# Patient Record
Sex: MALE | Race: WHITE
[De-identification: names, ages, dates, MRNs, and addresses within clinical notes are randomized per-mention and may not be internally consistent; named-entity substitution may affect disease eponyms.]

---

## 2021-12-08 ENCOUNTER — HOSPITAL ENCOUNTER (EMERGENCY)
Dept: HOSPITAL 26 - MED | Age: 26
Discharge: HOME | End: 2021-12-08
Payer: COMMERCIAL

## 2021-12-08 VITALS — BODY MASS INDEX: 29.1 KG/M2 | HEIGHT: 68 IN | WEIGHT: 192 LBS

## 2021-12-08 VITALS — DIASTOLIC BLOOD PRESSURE: 87 MMHG | SYSTOLIC BLOOD PRESSURE: 160 MMHG

## 2021-12-08 VITALS — SYSTOLIC BLOOD PRESSURE: 160 MMHG | DIASTOLIC BLOOD PRESSURE: 87 MMHG

## 2021-12-08 DIAGNOSIS — Z79.82: ICD-10-CM

## 2021-12-08 DIAGNOSIS — Z79.899: ICD-10-CM
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
## 2021-12-08 DIAGNOSIS — I48.91: ICD-10-CM

## 2021-12-08 DIAGNOSIS — S43.422A: Primary | ICD-10-CM
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
## 2021-12-08 NOTE — NUR
25 Y/O MALE C/O LEFT ARM PAIN 7/10 DESCRIBES AS ACHING RADIATES TO HAND WITH 
SPASMS, PARETHESIAS. PT STATES HE TOOK TYNENOL PRIOR TO ARRIVAL WITH SOME 
RELIEF. DENIES TRAUMA/INJURY. DENIES N/V, DENIES FEVER/CHILLS.



PMH: AFIB, LINDA



NKA

## 2021-12-08 NOTE — NUR
Patient discharged with v/s stable. Written and verbal after care instructions 
ABOUT SHOULDER SPRAIN given and explained. 

Patient alert, oriented and verbalized understanding of instructions. 
Ambulatory with steady gait. All questions addressed prior to discharge. ID 
band removed. Patient advised to follow up with PMD. Rx of FLEXERIL AND 
NAPROSYN  given.

## 2021-12-11 ENCOUNTER — HOSPITAL ENCOUNTER (INPATIENT)
Dept: HOSPITAL 26 - MED | Age: 26
LOS: 1 days | Discharge: HOME | DRG: 201 | End: 2021-12-12
Attending: INTERNAL MEDICINE | Admitting: INTERNAL MEDICINE
Payer: COMMERCIAL

## 2021-12-11 VITALS — DIASTOLIC BLOOD PRESSURE: 72 MMHG | SYSTOLIC BLOOD PRESSURE: 117 MMHG

## 2021-12-11 VITALS — DIASTOLIC BLOOD PRESSURE: 67 MMHG | SYSTOLIC BLOOD PRESSURE: 115 MMHG

## 2021-12-11 VITALS — DIASTOLIC BLOOD PRESSURE: 70 MMHG | SYSTOLIC BLOOD PRESSURE: 125 MMHG

## 2021-12-11 VITALS — WEIGHT: 192 LBS | HEIGHT: 68 IN | BODY MASS INDEX: 29.1 KG/M2

## 2021-12-11 VITALS — DIASTOLIC BLOOD PRESSURE: 74 MMHG | SYSTOLIC BLOOD PRESSURE: 124 MMHG

## 2021-12-11 VITALS — SYSTOLIC BLOOD PRESSURE: 132 MMHG | DIASTOLIC BLOOD PRESSURE: 102 MMHG

## 2021-12-11 VITALS — DIASTOLIC BLOOD PRESSURE: 66 MMHG | SYSTOLIC BLOOD PRESSURE: 119 MMHG

## 2021-12-11 DIAGNOSIS — Z79.82: ICD-10-CM

## 2021-12-11 DIAGNOSIS — Z79.899: ICD-10-CM

## 2021-12-11 DIAGNOSIS — F41.9: ICD-10-CM

## 2021-12-11 DIAGNOSIS — I48.0: Primary | ICD-10-CM

## 2021-12-11 DIAGNOSIS — I48.92: ICD-10-CM

## 2021-12-11 LAB
ALBUMIN FLD-MCNC: 3.9 G/DL (ref 3.4–5)
ANION GAP SERPL CALCULATED.3IONS-SCNC: 15.7 MMOL/L (ref 8–16)
AST SERPL-CCNC: 20 U/L (ref 15–37)
BASOPHILS # BLD AUTO: 0.1 K/UL (ref 0–0.22)
BASOPHILS NFR BLD AUTO: 0.6 % (ref 0–2)
BILIRUB SERPL-MCNC: 0.6 MG/DL (ref 0–1)
BUN SERPL-MCNC: 11 MG/DL (ref 7–18)
CHLORIDE SERPL-SCNC: 106 MMOL/L (ref 98–107)
CO2 SERPL-SCNC: 24.3 MMOL/L (ref 21–32)
CREAT SERPL-MCNC: 0.9 MG/DL (ref 0.6–1.3)
EOSINOPHIL # BLD AUTO: 0.1 K/UL (ref 0–0.4)
EOSINOPHIL NFR BLD AUTO: 1.4 % (ref 0–4)
ERYTHROCYTE [DISTWIDTH] IN BLOOD BY AUTOMATED COUNT: 13.8 % (ref 11.6–13.7)
GFR SERPL CREATININE-BSD FRML MDRD: 130 ML/MIN (ref 90–?)
GLUCOSE SERPL-MCNC: 133 MG/DL (ref 74–106)
HCT VFR BLD AUTO: 41.7 % (ref 36–52)
HGB BLD-MCNC: 14.4 G/DL (ref 12–18)
LYMPHOCYTES # BLD AUTO: 2.3 K/UL (ref 2–11.5)
LYMPHOCYTES NFR BLD AUTO: 29.1 % (ref 20.5–51.1)
MAGNESIUM SERPL-MCNC: 1.9 MG/DL (ref 1.8–2.4)
MCH RBC QN AUTO: 29 PG (ref 27–31)
MCHC RBC AUTO-ENTMCNC: 35 G/DL (ref 33–37)
MCV RBC AUTO: 85.2 FL (ref 80–94)
MONOCYTES # BLD AUTO: 0.5 K/UL (ref 0.8–1)
MONOCYTES NFR BLD AUTO: 6.8 % (ref 1.7–9.3)
NEUTROPHILS # BLD AUTO: 5 K/UL (ref 1.8–7.7)
NEUTROPHILS NFR BLD AUTO: 62.1 % (ref 42.2–75.2)
PLATELET # BLD AUTO: 203 K/UL (ref 140–450)
POTASSIUM SERPL-SCNC: 3 MMOL/L (ref 3.5–5.1)
RBC # BLD AUTO: 4.9 MIL/UL (ref 4.2–6.1)
SODIUM SERPL-SCNC: 143 MMOL/L (ref 136–145)
TSH SERPL DL<=0.05 MIU/L-ACNC: 0.79 UIU/ML (ref 0.34–3.74)
WBC # BLD AUTO: 8 K/UL (ref 4.8–10.8)

## 2021-12-11 RX ADMIN — SODIUM CHLORIDE SCH MLS/HR: 9 INJECTION, SOLUTION INTRAVENOUS at 12:01

## 2021-12-11 NOTE — NUR
PT RESTING IN BED, HOB ELEVATED, PER MD VERBAL ORDER FOR CARDIZEM 30MG IVP STAT 
TY, RN AT PT BEDSIDE GIVING AT THIS TIME, WILL CONTINUE TO MONITOR.

## 2021-12-11 NOTE — NUR
PT RESTING IN BED, VISIBLE EQUAL RISE AND FALL OF CHEST PT A&OX4. PER MD VERBAL 
ORDER FOR CARDIZEM 25MG IVP STAT. TY, RN AT PT BEDSIDE GIVING AT THIS TIME, 
WILL CONTINUE TO MONITOR.

## 2021-12-11 NOTE — NUR
Patient will be admitted to care of DR. SALOMON. Admited to ICU.  Will go to room 2. 
Belongings list completed.  Report to BERNICE JOHN.

## 2021-12-11 NOTE — NUR
PT ARRIVED IN UNIT VIA GURNEY, PT AMBULATED TO BED, TOLERATED WELL, PT AWAKE ALERT ORIENTED 
X4, SPEAKS ENGLISH. ON ROOM AIR, NO SOB NOTED, IV TO R AC 20G PATENT INTACT, INFUSING 
CARDIZEM DRIP @ 20 MG/HR, NS @ 80ML/HR, INFUSING WELL, INITIAL ASSESSMENT DONE, ALL SAFETY 
PRECAUTION MET, MRSA SWAB TAKEN, ORIENT PT TO BED, ROOM AND CALL LIGHT, PT STATED 
UNDERSTANDING, WILL CONTINUE TO MONITOR.

## 2021-12-11 NOTE — NUR
DR SANON AT BEDSIDE, PER DR SANON SINCE PT IS ALREADY ON SINUS RHYTHM TO STOP CARDIZEM DRIP. 
WILL CONTINUE WITH ORDERS.

## 2021-12-11 NOTE — NUR
RECEIVED IN BED 11 WITH C/O PALPITATIONS [id number]. HX OF A-FIB,  HAS SIMILAR 
SYMPTOMS TODAY. PT TOOK FLECANIDE PTA BUT CONTINUED WITH C/O PALPITATIONS



HX:A-FIB, GEN ANXIETY

RX:FLECAINIDE, IMIPRAMINE

NKDA

## 2021-12-12 VITALS — SYSTOLIC BLOOD PRESSURE: 101 MMHG | DIASTOLIC BLOOD PRESSURE: 65 MMHG

## 2021-12-12 VITALS — SYSTOLIC BLOOD PRESSURE: 104 MMHG | DIASTOLIC BLOOD PRESSURE: 66 MMHG

## 2021-12-12 VITALS — SYSTOLIC BLOOD PRESSURE: 110 MMHG | DIASTOLIC BLOOD PRESSURE: 68 MMHG

## 2021-12-12 LAB
ANION GAP SERPL CALCULATED.3IONS-SCNC: 12.3 MMOL/L (ref 8–16)
BASOPHILS # BLD AUTO: 0.1 K/UL (ref 0–0.22)
BASOPHILS NFR BLD AUTO: 0.8 % (ref 0–2)
BUN SERPL-MCNC: 9 MG/DL (ref 7–18)
CHLORIDE SERPL-SCNC: 105 MMOL/L (ref 98–107)
CO2 SERPL-SCNC: 26.4 MMOL/L (ref 21–32)
CREAT SERPL-MCNC: 0.9 MG/DL (ref 0.6–1.3)
EOSINOPHIL # BLD AUTO: 0.2 K/UL (ref 0–0.4)
EOSINOPHIL NFR BLD AUTO: 1.9 % (ref 0–4)
ERYTHROCYTE [DISTWIDTH] IN BLOOD BY AUTOMATED COUNT: 13.8 % (ref 11.6–13.7)
GFR SERPL CREATININE-BSD FRML MDRD: 130 ML/MIN (ref 90–?)
GLUCOSE SERPL-MCNC: 100 MG/DL (ref 74–106)
HCT VFR BLD AUTO: 41.4 % (ref 36–52)
HGB BLD-MCNC: 13.9 G/DL (ref 12–18)
LYMPHOCYTES # BLD AUTO: 3.5 K/UL (ref 2–11.5)
LYMPHOCYTES NFR BLD AUTO: 45.5 % (ref 20.5–51.1)
MCH RBC QN AUTO: 29 PG (ref 27–31)
MCHC RBC AUTO-ENTMCNC: 34 G/DL (ref 33–37)
MCV RBC AUTO: 85.8 FL (ref 80–94)
MONOCYTES # BLD AUTO: 0.7 K/UL (ref 0.8–1)
MONOCYTES NFR BLD AUTO: 8.5 % (ref 1.7–9.3)
NEUTROPHILS # BLD AUTO: 3.4 K/UL (ref 1.8–7.7)
NEUTROPHILS NFR BLD AUTO: 43.3 % (ref 42.2–75.2)
PLATELET # BLD AUTO: 225 K/UL (ref 140–450)
POTASSIUM SERPL-SCNC: 3.7 MMOL/L (ref 3.5–5.1)
RBC # BLD AUTO: 4.82 MIL/UL (ref 4.2–6.1)
SODIUM SERPL-SCNC: 140 MMOL/L (ref 136–145)
WBC # BLD AUTO: 7.8 K/UL (ref 4.8–10.8)

## 2021-12-12 RX ADMIN — SODIUM CHLORIDE SCH MLS/HR: 9 INJECTION, SOLUTION INTRAVENOUS at 00:26

## 2021-12-12 NOTE — NUR
DISCHARGE PATIENT TO HOME AMBULATORY, DISCHARGE INSTRUCTION GIVEN AND VERBALIZED 
UNDERSTANDING. UIN STABLE CONDITION.

## 2021-12-12 NOTE — NUR
RECEIVED REPORT FROM NIGHT SHIFT FOR CONTINUITY OF CARE. PATIENT IS ALERT AWAKE ORIENTED X4, 
NOT IN ANY DISTRESS NOTED. WITH IVF ON GOING AND INFUSING WELL. ON MONITOR SHOWS SR. DENIES 
CHEST PAIN. SEEN BY DR. SALOMON AND DISCUSSED THE PLAN OF CARE. WILL CONTINUE TO MONITOR.

## 2021-12-12 NOTE — NUR
DR. SALOMON CALLED AND HE SAID HE SPOKE TO DR. SANON AND CLEARED PATIENT FROM CARDIAC STANDPOIN. 
HE SAID TO DC PATIENT TODAY.

## 2021-12-12 NOTE — NUR
Transfer pt. to TELE Rm# [id number] via Wheelchair. Report given to DERRICK Ramirez RN. All questions 
answered.

## 2021-12-12 NOTE — NUR
Received pt form ICU last night around 0100. Upon getting on the unit, he did complain of a 
headache. Tylenol was administered. He has been feeling better since then. Care has been 
endorsed to AM RN.